# Patient Record
Sex: MALE | Race: WHITE | NOT HISPANIC OR LATINO | Employment: FULL TIME | URBAN - METROPOLITAN AREA
[De-identification: names, ages, dates, MRNs, and addresses within clinical notes are randomized per-mention and may not be internally consistent; named-entity substitution may affect disease eponyms.]

---

## 2017-04-17 ENCOUNTER — HOSPITAL ENCOUNTER (INPATIENT)
Facility: HOSPITAL | Age: 57
LOS: 1 days | DRG: 309 | End: 2017-04-18
Attending: EMERGENCY MEDICINE | Admitting: INTERNAL MEDICINE
Payer: COMMERCIAL

## 2017-04-17 ENCOUNTER — ALLSCRIPTS OFFICE VISIT (OUTPATIENT)
Dept: OTHER | Facility: OTHER | Age: 57
End: 2017-04-17

## 2017-04-17 ENCOUNTER — APPOINTMENT (EMERGENCY)
Dept: RADIOLOGY | Facility: HOSPITAL | Age: 57
DRG: 309 | End: 2017-04-17
Payer: COMMERCIAL

## 2017-04-17 DIAGNOSIS — R42 LIGHTHEADEDNESS: ICD-10-CM

## 2017-04-17 DIAGNOSIS — R94.31 EKG ABNORMALITIES: ICD-10-CM

## 2017-04-17 DIAGNOSIS — I47.2 V-TACH (HCC): Primary | ICD-10-CM

## 2017-04-17 LAB
ALBUMIN SERPL BCP-MCNC: 3.8 G/DL (ref 3.5–5)
ALP SERPL-CCNC: 95 U/L (ref 46–116)
ALT SERPL W P-5'-P-CCNC: 52 U/L (ref 12–78)
ANION GAP SERPL CALCULATED.3IONS-SCNC: 11 MMOL/L (ref 4–13)
APTT PPP: 26 SECONDS (ref 24–36)
AST SERPL W P-5'-P-CCNC: 30 U/L (ref 5–45)
ATRIAL RATE: 132 BPM
BASOPHILS # BLD AUTO: 0.03 THOUSANDS/ΜL (ref 0–0.1)
BASOPHILS NFR BLD AUTO: 0 % (ref 0–1)
BILIRUB SERPL-MCNC: 0.73 MG/DL (ref 0.2–1)
BUN SERPL-MCNC: 13 MG/DL (ref 5–25)
CALCIUM SERPL-MCNC: 9 MG/DL (ref 8.3–10.1)
CHLORIDE SERPL-SCNC: 105 MMOL/L (ref 100–108)
CO2 SERPL-SCNC: 25 MMOL/L (ref 21–32)
CREAT SERPL-MCNC: 1.06 MG/DL (ref 0.6–1.3)
EOSINOPHIL # BLD AUTO: 0.08 THOUSAND/ΜL (ref 0–0.61)
EOSINOPHIL NFR BLD AUTO: 1 % (ref 0–6)
ERYTHROCYTE [DISTWIDTH] IN BLOOD BY AUTOMATED COUNT: 13.1 % (ref 11.6–15.1)
GFR SERPL CREATININE-BSD FRML MDRD: >60 ML/MIN/1.73SQ M
GLUCOSE SERPL-MCNC: 100 MG/DL (ref 65–140)
HCT VFR BLD AUTO: 50 % (ref 36.5–49.3)
HGB BLD-MCNC: 17 G/DL (ref 12–17)
INR PPP: 1 (ref 0.86–1.16)
LYMPHOCYTES # BLD AUTO: 2.8 THOUSANDS/ΜL (ref 0.6–4.47)
LYMPHOCYTES NFR BLD AUTO: 21 % (ref 14–44)
MAGNESIUM SERPL-MCNC: 1.9 MG/DL (ref 1.6–2.6)
MCH RBC QN AUTO: 31.7 PG (ref 26.8–34.3)
MCHC RBC AUTO-ENTMCNC: 34 G/DL (ref 31.4–37.4)
MCV RBC AUTO: 93 FL (ref 82–98)
MONOCYTES # BLD AUTO: 1 THOUSAND/ΜL (ref 0.17–1.22)
MONOCYTES NFR BLD AUTO: 8 % (ref 4–12)
NEUTROPHILS # BLD AUTO: 9.41 THOUSANDS/ΜL (ref 1.85–7.62)
NEUTS SEG NFR BLD AUTO: 70 % (ref 43–75)
NRBC BLD AUTO-RTO: 0 /100 WBCS
P AXIS: -80 DEGREES
PLATELET # BLD AUTO: 289 THOUSANDS/UL (ref 149–390)
PMV BLD AUTO: 9.8 FL (ref 8.9–12.7)
POTASSIUM SERPL-SCNC: 4 MMOL/L (ref 3.5–5.3)
PROT SERPL-MCNC: 7.8 G/DL (ref 6.4–8.2)
PROTHROMBIN TIME: 13.2 SECONDS (ref 12–14.3)
QRS AXIS: 91 DEGREES
QRSD INTERVAL: 132 MS
QT INTERVAL: 350 MS
QTC INTERVAL: 518 MS
RBC # BLD AUTO: 5.36 MILLION/UL (ref 3.88–5.62)
SODIUM SERPL-SCNC: 141 MMOL/L (ref 136–145)
SPECIMEN SOURCE: NORMAL
T WAVE AXIS: -79 DEGREES
TROPONIN I BLD-MCNC: 0 NG/ML (ref 0–0.08)
TROPONIN I SERPL-MCNC: <0.02 NG/ML
TROPONIN I SERPL-MCNC: <0.02 NG/ML
TSH SERPL DL<=0.05 MIU/L-ACNC: 2.8 UIU/ML (ref 0.36–3.74)
VENTRICULAR RATE: 132 BPM
WBC # BLD AUTO: 13.32 THOUSAND/UL (ref 4.31–10.16)

## 2017-04-17 PROCEDURE — 85730 THROMBOPLASTIN TIME PARTIAL: CPT | Performed by: EMERGENCY MEDICINE

## 2017-04-17 PROCEDURE — 80053 COMPREHEN METABOLIC PANEL: CPT | Performed by: EMERGENCY MEDICINE

## 2017-04-17 PROCEDURE — 83735 ASSAY OF MAGNESIUM: CPT | Performed by: EMERGENCY MEDICINE

## 2017-04-17 PROCEDURE — 71010 HB CHEST X-RAY 1 VIEW FRONTAL (PORTABLE): CPT

## 2017-04-17 PROCEDURE — 84484 ASSAY OF TROPONIN QUANT: CPT

## 2017-04-17 PROCEDURE — 84443 ASSAY THYROID STIM HORMONE: CPT | Performed by: EMERGENCY MEDICINE

## 2017-04-17 PROCEDURE — 93005 ELECTROCARDIOGRAM TRACING: CPT

## 2017-04-17 PROCEDURE — 99285 EMERGENCY DEPT VISIT HI MDM: CPT

## 2017-04-17 PROCEDURE — 84484 ASSAY OF TROPONIN QUANT: CPT | Performed by: PHYSICIAN ASSISTANT

## 2017-04-17 PROCEDURE — 96361 HYDRATE IV INFUSION ADD-ON: CPT

## 2017-04-17 PROCEDURE — 85025 COMPLETE CBC W/AUTO DIFF WBC: CPT | Performed by: EMERGENCY MEDICINE

## 2017-04-17 PROCEDURE — 36415 COLL VENOUS BLD VENIPUNCTURE: CPT | Performed by: EMERGENCY MEDICINE

## 2017-04-17 PROCEDURE — 93005 ELECTROCARDIOGRAM TRACING: CPT | Performed by: EMERGENCY MEDICINE

## 2017-04-17 PROCEDURE — 96365 THER/PROPH/DIAG IV INF INIT: CPT

## 2017-04-17 PROCEDURE — 85610 PROTHROMBIN TIME: CPT | Performed by: EMERGENCY MEDICINE

## 2017-04-17 RX ORDER — ACETAMINOPHEN 325 MG/1
650 TABLET ORAL EVERY 4 HOURS PRN
Status: DISCONTINUED | OUTPATIENT
Start: 2017-04-17 | End: 2017-04-18 | Stop reason: HOSPADM

## 2017-04-17 RX ORDER — MULTIVITAMIN
1 TABLET ORAL DAILY
COMMUNITY

## 2017-04-17 RX ORDER — SODIUM CHLORIDE 9 MG/ML
50 INJECTION, SOLUTION INTRAVENOUS CONTINUOUS
Status: DISCONTINUED | OUTPATIENT
Start: 2017-04-17 | End: 2017-04-18 | Stop reason: HOSPADM

## 2017-04-17 RX ADMIN — AMIODARONE HYDROCHLORIDE 150 MG: 50 INJECTION, SOLUTION INTRAVENOUS at 13:47

## 2017-04-17 RX ADMIN — SODIUM CHLORIDE 50 ML/HR: 0.9 INJECTION, SOLUTION INTRAVENOUS at 17:04

## 2017-04-17 RX ADMIN — METOPROLOL TARTRATE 5 MG: 5 INJECTION INTRAVENOUS at 14:33

## 2017-04-17 RX ADMIN — SODIUM CHLORIDE 1000 ML: 0.9 INJECTION, SOLUTION INTRAVENOUS at 13:22

## 2017-04-17 RX ADMIN — METOPROLOL TARTRATE 5 MG: 5 INJECTION INTRAVENOUS at 15:07

## 2017-04-17 RX ADMIN — METOPROLOL TARTRATE 5 MG: 5 INJECTION INTRAVENOUS at 14:49

## 2017-04-17 RX ADMIN — METOPROLOL TARTRATE 25 MG: 25 TABLET ORAL at 17:08

## 2017-04-17 RX ADMIN — METOPROLOL TARTRATE 25 MG: 25 TABLET ORAL at 22:57

## 2017-04-18 ENCOUNTER — APPOINTMENT (INPATIENT)
Dept: NON INVASIVE DIAGNOSTICS | Facility: HOSPITAL | Age: 57
DRG: 309 | End: 2017-04-18
Payer: COMMERCIAL

## 2017-04-18 ENCOUNTER — APPOINTMENT (INPATIENT)
Dept: NON INVASIVE DIAGNOSTICS | Facility: HOSPITAL | Age: 57
DRG: 287 | End: 2017-04-18
Payer: COMMERCIAL

## 2017-04-18 ENCOUNTER — GENERIC CONVERSION - ENCOUNTER (OUTPATIENT)
Dept: OTHER | Facility: OTHER | Age: 57
End: 2017-04-18

## 2017-04-18 ENCOUNTER — APPOINTMENT (INPATIENT)
Dept: NON INVASIVE DIAGNOSTICS | Facility: HOSPITAL | Age: 57
DRG: 287 | End: 2017-04-18
Attending: INTERNAL MEDICINE
Payer: COMMERCIAL

## 2017-04-18 ENCOUNTER — HOSPITAL ENCOUNTER (INPATIENT)
Facility: HOSPITAL | Age: 57
LOS: 3 days | Discharge: HOME | DRG: 287 | End: 2017-04-21
Attending: INTERNAL MEDICINE | Admitting: INTERNAL MEDICINE
Payer: COMMERCIAL

## 2017-04-18 VITALS
RESPIRATION RATE: 21 BRPM | OXYGEN SATURATION: 93 % | DIASTOLIC BLOOD PRESSURE: 46 MMHG | WEIGHT: 315 LBS | BODY MASS INDEX: 45.1 KG/M2 | HEART RATE: 82 BPM | SYSTOLIC BLOOD PRESSURE: 107 MMHG | TEMPERATURE: 98.7 F | HEIGHT: 70 IN

## 2017-04-18 DIAGNOSIS — I47.2 V-TACH (HCC): ICD-10-CM

## 2017-04-18 LAB
ANION GAP SERPL CALCULATED.3IONS-SCNC: 10 MMOL/L (ref 4–13)
ANION GAP SERPL CALCULATED.3IONS-SCNC: 7 MMOL/L (ref 4–13)
ATRIAL RATE: 83 BPM
ATRIAL RATE: 85 BPM
BUN SERPL-MCNC: 17 MG/DL (ref 5–25)
BUN SERPL-MCNC: 17 MG/DL (ref 5–25)
CALCIUM SERPL-MCNC: 8.1 MG/DL (ref 8.3–10.1)
CALCIUM SERPL-MCNC: 8.9 MG/DL (ref 8.3–10.1)
CHLORIDE SERPL-SCNC: 105 MMOL/L (ref 100–108)
CHLORIDE SERPL-SCNC: 105 MMOL/L (ref 100–108)
CHOLEST SERPL-MCNC: 170 MG/DL (ref 50–200)
CO2 SERPL-SCNC: 23 MMOL/L (ref 21–32)
CO2 SERPL-SCNC: 24 MMOL/L (ref 21–32)
CREAT SERPL-MCNC: 0.79 MG/DL (ref 0.6–1.3)
CREAT SERPL-MCNC: 0.84 MG/DL (ref 0.6–1.3)
ERYTHROCYTE [DISTWIDTH] IN BLOOD BY AUTOMATED COUNT: 13 % (ref 11.6–15.1)
GFR SERPL CREATININE-BSD FRML MDRD: >60 ML/MIN/1.73SQ M
GFR SERPL CREATININE-BSD FRML MDRD: >60 ML/MIN/1.73SQ M
GLUCOSE SERPL-MCNC: 105 MG/DL (ref 65–140)
GLUCOSE SERPL-MCNC: 83 MG/DL (ref 65–140)
HCT VFR BLD AUTO: 46.9 % (ref 36.5–49.3)
HDLC SERPL-MCNC: 33 MG/DL (ref 40–60)
HGB BLD-MCNC: 15.8 G/DL (ref 12–17)
INR PPP: 1.07 (ref 0.86–1.16)
LDLC SERPL CALC-MCNC: 117 MG/DL (ref 0–100)
MAGNESIUM SERPL-MCNC: 2.1 MG/DL (ref 1.6–2.6)
MCH RBC QN AUTO: 31.3 PG (ref 26.8–34.3)
MCHC RBC AUTO-ENTMCNC: 33.7 G/DL (ref 31.4–37.4)
MCV RBC AUTO: 93 FL (ref 82–98)
P AXIS: 48 DEGREES
P AXIS: 57 DEGREES
PLATELET # BLD AUTO: 294 THOUSANDS/UL (ref 149–390)
PMV BLD AUTO: 9.9 FL (ref 8.9–12.7)
POTASSIUM SERPL-SCNC: 3.8 MMOL/L (ref 3.5–5.3)
POTASSIUM SERPL-SCNC: 5.6 MMOL/L (ref 3.5–5.3)
PR INTERVAL: 200 MS
PR INTERVAL: 213 MS
PROTHROMBIN TIME: 14 SECONDS (ref 12–14.3)
QRS AXIS: 15 DEGREES
QRS AXIS: 25 DEGREES
QRSD INTERVAL: 104 MS
QRSD INTERVAL: 96 MS
QT INTERVAL: 388 MS
QT INTERVAL: 404 MS
QTC INTERVAL: 456 MS
QTC INTERVAL: 480 MS
RBC # BLD AUTO: 5.05 MILLION/UL (ref 3.88–5.62)
SODIUM SERPL-SCNC: 136 MMOL/L (ref 136–145)
SODIUM SERPL-SCNC: 138 MMOL/L (ref 136–145)
T WAVE AXIS: 64 DEGREES
T WAVE AXIS: 65 DEGREES
TRIGL SERPL-MCNC: 98 MG/DL
VENTRICULAR RATE: 83 BPM
VENTRICULAR RATE: 85 BPM
WBC # BLD AUTO: 11.53 THOUSAND/UL (ref 4.31–10.16)

## 2017-04-18 PROCEDURE — A9270 NON-COVERED ITEM OR SERVICE: HCPCS | Performed by: INTERNAL MEDICINE

## 2017-04-18 PROCEDURE — 80048 BASIC METABOLIC PNL TOTAL CA: CPT | Performed by: PHYSICIAN ASSISTANT

## 2017-04-18 PROCEDURE — 83735 ASSAY OF MAGNESIUM: CPT | Performed by: PHYSICIAN ASSISTANT

## 2017-04-18 PROCEDURE — C1769 GUIDE WIRE: HCPCS | Performed by: INTERNAL MEDICINE

## 2017-04-18 PROCEDURE — 93306 TTE W/DOPPLER COMPLETE: CPT

## 2017-04-18 PROCEDURE — 93458 L HRT ARTERY/VENTRICLE ANGIO: CPT | Performed by: INTERNAL MEDICINE

## 2017-04-18 PROCEDURE — B2151ZZ FLUOROSCOPY OF LEFT HEART USING LOW OSMOLAR CONTRAST: ICD-10-PCS | Performed by: INTERNAL MEDICINE

## 2017-04-18 PROCEDURE — 80048 BASIC METABOLIC PNL TOTAL CA: CPT | Performed by: INTERNAL MEDICINE

## 2017-04-18 PROCEDURE — 99153 MOD SED SAME PHYS/QHP EA: CPT | Performed by: INTERNAL MEDICINE

## 2017-04-18 PROCEDURE — 85610 PROTHROMBIN TIME: CPT | Performed by: INTERNAL MEDICINE

## 2017-04-18 PROCEDURE — B2111ZZ FLUOROSCOPY OF MULTIPLE CORONARY ARTERIES USING LOW OSMOLAR CONTRAST: ICD-10-PCS | Performed by: INTERNAL MEDICINE

## 2017-04-18 PROCEDURE — 99152 MOD SED SAME PHYS/QHP 5/>YRS: CPT | Performed by: INTERNAL MEDICINE

## 2017-04-18 PROCEDURE — 85027 COMPLETE CBC AUTOMATED: CPT | Performed by: INTERNAL MEDICINE

## 2017-04-18 PROCEDURE — C1894 INTRO/SHEATH, NON-LASER: HCPCS | Performed by: INTERNAL MEDICINE

## 2017-04-18 PROCEDURE — 4A023N7 MEASUREMENT OF CARDIAC SAMPLING AND PRESSURE, LEFT HEART, PERCUTANEOUS APPROACH: ICD-10-PCS | Performed by: INTERNAL MEDICINE

## 2017-04-18 PROCEDURE — 80061 LIPID PANEL: CPT | Performed by: INTERNAL MEDICINE

## 2017-04-18 PROCEDURE — 93005 ELECTROCARDIOGRAM TRACING: CPT | Performed by: INTERNAL MEDICINE

## 2017-04-18 RX ORDER — ACETAMINOPHEN 325 MG/1
650 TABLET ORAL EVERY 4 HOURS PRN
Status: CANCELLED | OUTPATIENT
Start: 2017-04-18

## 2017-04-18 RX ORDER — SODIUM CHLORIDE 9 MG/ML
50 INJECTION, SOLUTION INTRAVENOUS CONTINUOUS
Status: DISCONTINUED | OUTPATIENT
Start: 2017-04-18 | End: 2017-04-18

## 2017-04-18 RX ORDER — SODIUM CHLORIDE 9 MG/ML
50 INJECTION, SOLUTION INTRAVENOUS CONTINUOUS
Status: CANCELLED | OUTPATIENT
Start: 2017-04-18 | End: 2017-04-29

## 2017-04-18 RX ORDER — MIDAZOLAM HYDROCHLORIDE 1 MG/ML
INJECTION INTRAMUSCULAR; INTRAVENOUS CODE/TRAUMA/SEDATION MEDICATION
Status: COMPLETED | OUTPATIENT
Start: 2017-04-18 | End: 2017-04-18

## 2017-04-18 RX ORDER — SODIUM CHLORIDE 9 MG/ML
100 INJECTION, SOLUTION INTRAVENOUS CONTINUOUS
Status: DISPENSED | OUTPATIENT
Start: 2017-04-18 | End: 2017-04-18

## 2017-04-18 RX ORDER — FENTANYL CITRATE 50 UG/ML
INJECTION, SOLUTION INTRAMUSCULAR; INTRAVENOUS CODE/TRAUMA/SEDATION MEDICATION
Status: COMPLETED | OUTPATIENT
Start: 2017-04-18 | End: 2017-04-18

## 2017-04-18 RX ORDER — ASPIRIN 81 MG/1
TABLET, CHEWABLE ORAL CODE/TRAUMA/SEDATION MEDICATION
Status: COMPLETED | OUTPATIENT
Start: 2017-04-18 | End: 2017-04-18

## 2017-04-18 RX ORDER — ASPIRIN 81 MG/1
324 TABLET, CHEWABLE ORAL ONCE
Status: DISCONTINUED | OUTPATIENT
Start: 2017-04-18 | End: 2017-04-21 | Stop reason: HOSPADM

## 2017-04-18 RX ORDER — LIDOCAINE HYDROCHLORIDE 10 MG/ML
INJECTION, SOLUTION INFILTRATION; PERINEURAL CODE/TRAUMA/SEDATION MEDICATION
Status: COMPLETED | OUTPATIENT
Start: 2017-04-18 | End: 2017-04-18

## 2017-04-18 RX ORDER — SODIUM CHLORIDE 9 MG/ML
75 INJECTION, SOLUTION INTRAVENOUS CONTINUOUS
Status: DISCONTINUED | OUTPATIENT
Start: 2017-04-18 | End: 2017-04-21 | Stop reason: HOSPADM

## 2017-04-18 RX ORDER — HEPARIN SODIUM 1000 [USP'U]/ML
INJECTION, SOLUTION INTRAVENOUS; SUBCUTANEOUS CODE/TRAUMA/SEDATION MEDICATION
Status: COMPLETED | OUTPATIENT
Start: 2017-04-18 | End: 2017-04-18

## 2017-04-18 RX ORDER — ACETAMINOPHEN 325 MG/1
650 TABLET ORAL EVERY 4 HOURS PRN
Status: DISCONTINUED | OUTPATIENT
Start: 2017-04-18 | End: 2017-04-21 | Stop reason: HOSPADM

## 2017-04-18 RX ORDER — ASPIRIN 325 MG
325 TABLET, DELAYED RELEASE (ENTERIC COATED) ORAL ONCE
Status: COMPLETED | OUTPATIENT
Start: 2017-04-18 | End: 2017-04-18

## 2017-04-18 RX ORDER — VERAPAMIL HYDROCHLORIDE 2.5 MG/ML
INJECTION, SOLUTION INTRAVENOUS CODE/TRAUMA/SEDATION MEDICATION
Status: COMPLETED | OUTPATIENT
Start: 2017-04-18 | End: 2017-04-18

## 2017-04-18 RX ORDER — NITROGLYCERIN 20 MG/100ML
INJECTION INTRAVENOUS CODE/TRAUMA/SEDATION MEDICATION
Status: COMPLETED | OUTPATIENT
Start: 2017-04-18 | End: 2017-04-18

## 2017-04-18 RX ADMIN — ASPIRIN 324 MG: 81 TABLET, CHEWABLE ORAL at 13:20

## 2017-04-18 RX ADMIN — HEPARIN SODIUM 4000 UNITS: 1000 INJECTION INTRAVENOUS; SUBCUTANEOUS at 13:24

## 2017-04-18 RX ADMIN — ASPIRIN 325 MG: 325 TABLET, DELAYED RELEASE ORAL at 12:30

## 2017-04-18 RX ADMIN — LIDOCAINE HYDROCHLORIDE 1 ML: 10 INJECTION, SOLUTION INFILTRATION; PERINEURAL at 13:22

## 2017-04-18 RX ADMIN — NITROGLYCERIN 200 MCG: 20 INJECTION INTRAVENOUS at 13:24

## 2017-04-18 RX ADMIN — SODIUM CHLORIDE 75 ML/HR: 0.9 INJECTION, SOLUTION INTRAVENOUS at 22:35

## 2017-04-18 RX ADMIN — METOPROLOL TARTRATE 25 MG: 25 TABLET ORAL at 05:27

## 2017-04-18 RX ADMIN — METOPROLOL TARTRATE 25 MG: 25 TABLET ORAL at 17:59

## 2017-04-18 RX ADMIN — MIDAZOLAM HYDROCHLORIDE 2 MG: 1 INJECTION, SOLUTION INTRAMUSCULAR; INTRAVENOUS at 13:20

## 2017-04-18 RX ADMIN — VERAPAMIL HYDROCHLORIDE 2.5 MG: 2.5 INJECTION, SOLUTION INTRAVENOUS at 13:24

## 2017-04-18 RX ADMIN — FENTANYL CITRATE 50 MCG: 50 INJECTION, SOLUTION INTRAMUSCULAR; INTRAVENOUS at 13:20

## 2017-04-18 RX ADMIN — FENTANYL CITRATE 25 MCG: 50 INJECTION, SOLUTION INTRAMUSCULAR; INTRAVENOUS at 13:41

## 2017-04-18 RX ADMIN — METOPROLOL TARTRATE 25 MG: 25 TABLET ORAL at 22:33

## 2017-04-18 RX ADMIN — ENOXAPARIN SODIUM 40 MG: 40 INJECTION SUBCUTANEOUS at 09:42

## 2017-04-18 RX ADMIN — SODIUM CHLORIDE 100 ML/HR: 0.9 INJECTION, SOLUTION INTRAVENOUS at 14:28

## 2017-04-18 RX ADMIN — MIDAZOLAM HYDROCHLORIDE 1 MG: 1 INJECTION, SOLUTION INTRAMUSCULAR; INTRAVENOUS at 13:41

## 2017-04-18 RX ADMIN — IOHEXOL 80 ML: 350 INJECTION, SOLUTION INTRAVENOUS at 13:43

## 2017-04-18 RX ADMIN — SODIUM CHLORIDE 50 ML/HR: 0.9 INJECTION, SOLUTION INTRAVENOUS at 12:30

## 2017-04-19 LAB
ANION GAP SERPL CALCULATED.3IONS-SCNC: 10 MMOL/L (ref 4–13)
BUN SERPL-MCNC: 15 MG/DL (ref 5–25)
CALCIUM SERPL-MCNC: 8.4 MG/DL (ref 8.3–10.1)
CHLORIDE SERPL-SCNC: 106 MMOL/L (ref 100–108)
CO2 SERPL-SCNC: 23 MMOL/L (ref 21–32)
CREAT SERPL-MCNC: 0.94 MG/DL (ref 0.6–1.3)
GFR SERPL CREATININE-BSD FRML MDRD: >60 ML/MIN/1.73SQ M
GLUCOSE SERPL-MCNC: 109 MG/DL (ref 65–140)
MAGNESIUM SERPL-MCNC: 2.2 MG/DL (ref 1.6–2.6)
POTASSIUM SERPL-SCNC: 3.9 MMOL/L (ref 3.5–5.3)
SODIUM SERPL-SCNC: 139 MMOL/L (ref 136–145)

## 2017-04-19 PROCEDURE — A9270 NON-COVERED ITEM OR SERVICE: HCPCS | Performed by: PHYSICIAN ASSISTANT

## 2017-04-19 PROCEDURE — 80048 BASIC METABOLIC PNL TOTAL CA: CPT | Performed by: INTERNAL MEDICINE

## 2017-04-19 PROCEDURE — A9270 NON-COVERED ITEM OR SERVICE: HCPCS | Performed by: INTERNAL MEDICINE

## 2017-04-19 PROCEDURE — 83735 ASSAY OF MAGNESIUM: CPT | Performed by: INTERNAL MEDICINE

## 2017-04-19 RX ORDER — METOPROLOL SUCCINATE 50 MG/1
50 TABLET, EXTENDED RELEASE ORAL EVERY 12 HOURS SCHEDULED
Status: DISCONTINUED | OUTPATIENT
Start: 2017-04-19 | End: 2017-04-21 | Stop reason: HOSPADM

## 2017-04-19 RX ADMIN — METOPROLOL TARTRATE 25 MG: 25 TABLET ORAL at 11:02

## 2017-04-19 RX ADMIN — METOPROLOL SUCCINATE 50 MG: 50 TABLET, EXTENDED RELEASE ORAL at 21:47

## 2017-04-19 RX ADMIN — METOPROLOL TARTRATE 25 MG: 25 TABLET ORAL at 04:47

## 2017-04-19 RX ADMIN — METOPROLOL TARTRATE 25 MG: 25 TABLET ORAL at 16:30

## 2017-04-20 ENCOUNTER — GENERIC CONVERSION - ENCOUNTER (OUTPATIENT)
Dept: OTHER | Facility: OTHER | Age: 57
End: 2017-04-20

## 2017-04-20 ENCOUNTER — APPOINTMENT (INPATIENT)
Dept: RADIOLOGY | Facility: HOSPITAL | Age: 57
DRG: 287 | End: 2017-04-20
Payer: COMMERCIAL

## 2017-04-20 LAB
ATRIAL RATE: 91 BPM
P AXIS: 42 DEGREES
PR INTERVAL: 208 MS
QRS AXIS: 2 DEGREES
QRSD INTERVAL: 102 MS
QT INTERVAL: 386 MS
QTC INTERVAL: 474 MS
T WAVE AXIS: 54 DEGREES
VENTRICULAR RATE: 91 BPM

## 2017-04-20 PROCEDURE — A9270 NON-COVERED ITEM OR SERVICE: HCPCS | Performed by: PHYSICIAN ASSISTANT

## 2017-04-20 PROCEDURE — 93005 ELECTROCARDIOGRAM TRACING: CPT | Performed by: PHYSICIAN ASSISTANT

## 2017-04-20 PROCEDURE — 75561 CARDIAC MRI FOR MORPH W/DYE: CPT

## 2017-04-20 PROCEDURE — A9585 GADOBUTROL INJECTION: HCPCS | Performed by: INTERNAL MEDICINE

## 2017-04-20 RX ORDER — FLECAINIDE ACETATE 100 MG/1
100 TABLET ORAL ONCE
Status: COMPLETED | OUTPATIENT
Start: 2017-04-20 | End: 2017-04-20

## 2017-04-20 RX ORDER — FLECAINIDE ACETATE 100 MG/1
100 TABLET ORAL EVERY 12 HOURS SCHEDULED
Status: DISCONTINUED | OUTPATIENT
Start: 2017-04-20 | End: 2017-04-21 | Stop reason: HOSPADM

## 2017-04-20 RX ADMIN — METOPROLOL SUCCINATE 50 MG: 50 TABLET, EXTENDED RELEASE ORAL at 21:48

## 2017-04-20 RX ADMIN — FLECAINIDE ACETATE 100 MG: 100 TABLET ORAL at 14:26

## 2017-04-20 RX ADMIN — FLECAINIDE ACETATE 100 MG: 100 TABLET ORAL at 22:21

## 2017-04-20 RX ADMIN — GADOBUTROL 30 ML: 604.72 INJECTION INTRAVENOUS at 11:11

## 2017-04-20 RX ADMIN — METOPROLOL SUCCINATE 50 MG: 50 TABLET, EXTENDED RELEASE ORAL at 08:42

## 2017-04-21 VITALS
BODY MASS INDEX: 45.1 KG/M2 | DIASTOLIC BLOOD PRESSURE: 80 MMHG | HEART RATE: 74 BPM | RESPIRATION RATE: 20 BRPM | WEIGHT: 315 LBS | OXYGEN SATURATION: 96 % | SYSTOLIC BLOOD PRESSURE: 122 MMHG | TEMPERATURE: 98.6 F | HEIGHT: 70 IN

## 2017-04-21 PROCEDURE — A9270 NON-COVERED ITEM OR SERVICE: HCPCS | Performed by: PHYSICIAN ASSISTANT

## 2017-04-21 RX ORDER — METOPROLOL SUCCINATE 50 MG/1
50 TABLET, EXTENDED RELEASE ORAL EVERY 12 HOURS SCHEDULED
Qty: 60 TABLET | Refills: 3 | Status: SHIPPED | OUTPATIENT
Start: 2017-04-21 | End: 2018-06-02 | Stop reason: SDUPTHER

## 2017-04-21 RX ORDER — FLECAINIDE ACETATE 100 MG/1
100 TABLET ORAL EVERY 12 HOURS SCHEDULED
Qty: 60 TABLET | Refills: 2 | Status: SHIPPED | OUTPATIENT
Start: 2017-04-21 | End: 2018-06-02 | Stop reason: SDUPTHER

## 2017-04-21 RX ADMIN — METOPROLOL SUCCINATE 50 MG: 50 TABLET, EXTENDED RELEASE ORAL at 09:10

## 2017-04-21 RX ADMIN — FLECAINIDE ACETATE 100 MG: 100 TABLET ORAL at 09:10

## 2017-04-28 ENCOUNTER — ALLSCRIPTS OFFICE VISIT (OUTPATIENT)
Dept: OTHER | Facility: OTHER | Age: 57
End: 2017-04-28

## 2017-06-08 ENCOUNTER — HOSPITAL ENCOUNTER (OUTPATIENT)
Dept: NON INVASIVE DIAGNOSTICS | Facility: HOSPITAL | Age: 57
Discharge: HOME | End: 2017-06-08
Payer: COMMERCIAL

## 2017-06-08 DIAGNOSIS — R00.0 TACHYCARDIA: ICD-10-CM

## 2017-06-08 PROCEDURE — 93225 XTRNL ECG REC<48 HRS REC: CPT

## 2017-06-08 PROCEDURE — 93226 XTRNL ECG REC<48 HR SCAN A/R: CPT

## 2017-06-09 ENCOUNTER — ALLSCRIPTS OFFICE VISIT (OUTPATIENT)
Dept: OTHER | Facility: OTHER | Age: 57
End: 2017-06-09

## 2017-06-29 ENCOUNTER — APPOINTMENT (OUTPATIENT)
Dept: OCCUPATIONAL MEDICINE | Facility: CLINIC | Age: 57
End: 2017-06-29
Payer: OTHER MISCELLANEOUS

## 2017-06-29 ENCOUNTER — TRANSCRIBE ORDERS (OUTPATIENT)
Dept: OCCUPATIONAL MEDICINE | Facility: CLINIC | Age: 57
End: 2017-06-29

## 2017-06-29 ENCOUNTER — APPOINTMENT (OUTPATIENT)
Dept: RADIOLOGY | Facility: CLINIC | Age: 57
End: 2017-06-29
Payer: OTHER MISCELLANEOUS

## 2017-06-29 DIAGNOSIS — S99.912A ANKLE INJURY, LEFT, INITIAL ENCOUNTER: ICD-10-CM

## 2017-06-29 DIAGNOSIS — S89.91XA LEG INJURY, RIGHT, INITIAL ENCOUNTER: Primary | ICD-10-CM

## 2017-06-29 DIAGNOSIS — S89.91XA LEG INJURY, RIGHT, INITIAL ENCOUNTER: ICD-10-CM

## 2017-06-29 PROCEDURE — 73610 X-RAY EXAM OF ANKLE: CPT

## 2017-06-29 PROCEDURE — 73590 X-RAY EXAM OF LOWER LEG: CPT

## 2017-06-29 PROCEDURE — 99203 OFFICE O/P NEW LOW 30 MIN: CPT

## 2017-07-03 ENCOUNTER — APPOINTMENT (OUTPATIENT)
Dept: OCCUPATIONAL MEDICINE | Facility: CLINIC | Age: 57
End: 2017-07-03
Payer: OTHER MISCELLANEOUS

## 2017-07-03 PROCEDURE — 99213 OFFICE O/P EST LOW 20 MIN: CPT

## 2017-10-10 ENCOUNTER — ALLSCRIPTS OFFICE VISIT (OUTPATIENT)
Dept: OTHER | Facility: OTHER | Age: 57
End: 2017-10-10

## 2017-10-11 ENCOUNTER — LAB REQUISITION (OUTPATIENT)
Dept: LAB | Facility: HOSPITAL | Age: 57
End: 2017-10-11
Payer: COMMERCIAL

## 2017-10-11 DIAGNOSIS — Z13.1 ENCOUNTER FOR SCREENING FOR DIABETES MELLITUS: ICD-10-CM

## 2017-10-11 DIAGNOSIS — Z00.00 ENCOUNTER FOR GENERAL ADULT MEDICAL EXAMINATION WITHOUT ABNORMAL FINDINGS: ICD-10-CM

## 2017-10-11 DIAGNOSIS — Z12.11 ENCOUNTER FOR SCREENING FOR MALIGNANT NEOPLASM OF COLON: ICD-10-CM

## 2017-10-11 DIAGNOSIS — E66.01 MORBID (SEVERE) OBESITY DUE TO EXCESS CALORIES (HCC): ICD-10-CM

## 2017-10-11 DIAGNOSIS — Z13.29 ENCOUNTER FOR SCREENING FOR OTHER SUSPECTED ENDOCRINE DISORDER: ICD-10-CM

## 2017-10-11 DIAGNOSIS — Z12.5 ENCOUNTER FOR SCREENING FOR MALIGNANT NEOPLASM OF PROSTATE: ICD-10-CM

## 2017-10-11 LAB
ALBUMIN SERPL BCP-MCNC: 4.4 G/DL (ref 3.5–5)
ALP SERPL-CCNC: 87 U/L (ref 46–116)
ALT SERPL W P-5'-P-CCNC: 49 U/L (ref 12–78)
ANION GAP SERPL CALCULATED.3IONS-SCNC: 10 MMOL/L (ref 4–13)
AST SERPL W P-5'-P-CCNC: 39 U/L (ref 5–45)
BILIRUB SERPL-MCNC: 1.14 MG/DL (ref 0.2–1)
BUN SERPL-MCNC: 14 MG/DL (ref 5–25)
CALCIUM SERPL-MCNC: 9.1 MG/DL (ref 8.3–10.1)
CHLORIDE SERPL-SCNC: 104 MMOL/L (ref 100–108)
CHOLEST SERPL-MCNC: 192 MG/DL (ref 50–200)
CO2 SERPL-SCNC: 25 MMOL/L (ref 21–32)
CREAT SERPL-MCNC: 0.95 MG/DL (ref 0.6–1.3)
ERYTHROCYTE [DISTWIDTH] IN BLOOD BY AUTOMATED COUNT: 12.9 % (ref 11.6–15.1)
EST. AVERAGE GLUCOSE BLD GHB EST-MCNC: 126 MG/DL
GFR SERPL CREATININE-BSD FRML MDRD: 90 ML/MIN/1.73SQ M
GLUCOSE P FAST SERPL-MCNC: 79 MG/DL (ref 65–99)
HBA1C MFR BLD: 6 % (ref 4.2–6.3)
HCT VFR BLD AUTO: 47 % (ref 36.5–49.3)
HDLC SERPL-MCNC: 33 MG/DL (ref 40–60)
HGB BLD-MCNC: 15.9 G/DL (ref 12–17)
LDLC SERPL CALC-MCNC: 131 MG/DL (ref 0–100)
MCH RBC QN AUTO: 32 PG (ref 26.8–34.3)
MCHC RBC AUTO-ENTMCNC: 33.8 G/DL (ref 31.4–37.4)
MCV RBC AUTO: 95 FL (ref 82–98)
PLATELET # BLD AUTO: 320 THOUSANDS/UL (ref 149–390)
PMV BLD AUTO: 10 FL (ref 8.9–12.7)
POTASSIUM SERPL-SCNC: 4 MMOL/L (ref 3.5–5.3)
PROT SERPL-MCNC: 8.1 G/DL (ref 6.4–8.2)
PSA SERPL-MCNC: 0.2 NG/ML (ref 0–4)
RBC # BLD AUTO: 4.97 MILLION/UL (ref 3.88–5.62)
SODIUM SERPL-SCNC: 139 MMOL/L (ref 136–145)
TRIGL SERPL-MCNC: 138 MG/DL
TSH SERPL DL<=0.05 MIU/L-ACNC: 2.69 UIU/ML (ref 0.36–3.74)
WBC # BLD AUTO: 11.5 THOUSAND/UL (ref 4.31–10.16)

## 2017-10-11 PROCEDURE — 83036 HEMOGLOBIN GLYCOSYLATED A1C: CPT | Performed by: FAMILY MEDICINE

## 2017-10-11 PROCEDURE — 80053 COMPREHEN METABOLIC PANEL: CPT | Performed by: FAMILY MEDICINE

## 2017-10-11 PROCEDURE — 84443 ASSAY THYROID STIM HORMONE: CPT | Performed by: FAMILY MEDICINE

## 2017-10-11 PROCEDURE — 80061 LIPID PANEL: CPT | Performed by: FAMILY MEDICINE

## 2017-10-11 PROCEDURE — G0103 PSA SCREENING: HCPCS | Performed by: FAMILY MEDICINE

## 2017-10-11 PROCEDURE — 85027 COMPLETE CBC AUTOMATED: CPT | Performed by: FAMILY MEDICINE

## 2017-10-12 ENCOUNTER — GENERIC CONVERSION - ENCOUNTER (OUTPATIENT)
Dept: OTHER | Facility: OTHER | Age: 57
End: 2017-10-12

## 2017-12-07 ENCOUNTER — ALLSCRIPTS OFFICE VISIT (OUTPATIENT)
Dept: OTHER | Facility: OTHER | Age: 57
End: 2017-12-07

## 2017-12-19 NOTE — PROGRESS NOTES
Assessment  Assessed    1  Ventricular tachycardia (427 1) (I47 2)   2  Morbid obesity (278 01) (E66 01)    Plan  Health Maintenance    · EKG/ECG- POC; Status:Complete;   Done: 66YCY6150 10:58AM   Perform: In Office; Last Updated By:Jameson Nava; 12/7/2017 10:59:09 AM;Ordered;For:Health Maintenance; Ordered By:Chapo Correa; Discussion/Summary  Cardiology Discussion Summary Free Text Note Form [facility]:   1  Ventricular tachycardiano further episodesWas admitted with episode of lightheadedness, ventricular tachycardia noted on EKGOrigin of tachycardia - right ventricular outflow tract, septal, anteriorCardiogram shows retained ventricular functionCardiac MRI did not show any evidence of dysplasia or scarCardiac catheterization did not reveal any significant epicardial heart diseasePatient is started on a combination of flecainide and metoprololHe has tolerated the same wellA repeat Holter does not reveal any ventricular tachycardia or PVCGoal is to continue the current treatment regimenThe QRS has remained stable without any prolongation2  Morbid obesityThe patient does have significant obesityDiscussed in detail the role of diet and exerciseDietetic counselor for about 80% of increased weightstressed importance of stopping pizza as well as pasta  Chief Complaint  follow up of VT Chief Complaint Free Text Note Form: 6 month follow up with EKG      History of Present Illness  Cardiology HPI Free Text Note Form [facility]: no further episodes of VTWhen he had his episode of ventricular tachycardia, his symptoms were lightheadedness and palpitation  Currently he does not have any further episodes of palpitationHe does not describe angina like chest pain or chest pressureHe is not complaining of worsening orthopnea, paroxysmal nocturnal dyspnea or leg swellingHe is not complaining of any episodes of syncopehe has not lost weightThe patient is well known to me from hospital admissionHe came in with right ventricular outflow tract tachycardiaHe underwent a cardiac catheterization echocardiogram and cardiac MRI which were all normalThere is no evidence of scar in the heartThe patient was started on a combination of metoprolol and flecainideThe patient's work involves walking up to 2 miles in a dayHe has no limitation in walking on level groundHe does get short of breath on walking uphill or going up more than 2 flights of stairsHe does not have any history of diabetes, hyperlipidemiaHe does have a strong family history of diabetesHe snores at night, has daytime sleepiness, and morning fatigabilityHe has still not controlled his diet and His BMI is close to 50      Review of Systems  As described in my history of present illness All other systems reviewed and are negative Cardiology Male ROS:    Cardiac: No complaints of chest pain, no palpitations, no fainiting  Skin: No complaints of nonhealing sores or skin rash  Genitourinary: No complaints of recurrent urinary tract infections, frequent urination at night, difficult urination, blood in urine, kidney stones, loss of bladder control, no kidney or prostate problems, no erectile dysfunction  Psychological: No complaints of feeling depressed, anxiety, panic attacks, or difficulty concentrating  General: lack of energy/fatigue  Respiratory: No complaints of shortness of breath, cough with sputum, or wheezing  HEENT: No complaints of serious problems, hearing problems, nose problems, throat problems, or snoring  Gastrointestinal: No complaints of liver problems, nausea, vomiting, heartburn, constipation, bloody stools, diarrhea, problems swallowing, adbominal pain, or rectal bleeding  Neurological: No complaints of numbness, tingling, dizziness, weakness, seizures, headaches, syncope or fainting, AM fatigue, daytime sleepiness, no witnessed apnea episodes  Musculoskeletal: back pain      Active Problems  Problems    1  Colon cancer screening (V76 51) (Z12 11)   2   Encounter for prostate cancer screening (V76 44) (Z12 5)   3  Morbid obesity (278 01) (E66 01)   4  Preventative health care (V70 0) (Z00 00)   5  Screening for cholesterol level (V77 91) (Z13 220)   6  Screening for diabetes mellitus (DM) (V77 1) (Z13 1)   7  Screening for iron deficiency anemia (V78 0) (Z13 0)   8  Screening for thyroid disorder (V77 0) (Z13 29)   9  Tinnitus (388 30) (H93 19)   10  Ventricular tachycardia (427 1) (I47 2)   11  Vision problem (V41 0) (H54 7)    Past Medical History  Problems    1  History of Acute upper respiratory infection (465 9) (J06 9)   2  History of Encounter for screening for diabetes mellitus (V77 1) (Z13 1)   3  Denied: History of substance abuse   4  History of tinnitus (V12 49) (Z86 69)   5  History of Intermittent lightheadedness (780 4) (R42)   6  History of Laceration of thumb, left (883 0) (S61 012A)   7  Denied: History of Mental health problem   8  History of Pressure in head (784 0) (R51)   9  History of Screening for cardiovascular condition (V81 2) (Z13 6)   10  History of Screening for deficiency anemia (V78 1) (Z13 0)   11  History of Screening for thyroid disorder (V77 0) (Z13 29)  Active Problems And Past Medical History Reviewed: The active problems and past medical history were reviewed and updated today  Surgical History  Surgical History Reviewed: The surgical history was reviewed and updated today  Family History  Mother    1  Family history of pancreatic cancer (V16 0) (Z80 0)   2  Denied: Family history of substance abuse   3  Denied: Family history of Mental health problem  Father    4  Family history of cardiac disorder (V17 49) (Z82 49)   5  Family history of myocardial infarction (V17 3) (Z82 49)   6  Denied: Family history of substance abuse   7  Denied: Family history of Mental health problem  Family History Reviewed: The family history was reviewed and updated today         Social History  Problems    ·    · Never a smoker   · No alcohol use  Social History Reviewed: The social history was reviewed and updated today  Current Meds   1  Flecainide Acetate 100 MG Oral Tablet; Take 1 tablet twice daily  Requested for: 20Jun2017; Last Rx:19Jun2017 Ordered   2  Metoprolol Succinate ER 50 MG Oral Tablet Extended Release 24 Hour; TAKE 1 TABLET TWICE DAILY; Therapy: 28ZHW8230 to (Evaluate:15Jun2018)  Requested for: 27Jun2017; Last Rx:20Jun2017 Ordered   3  Multiple Vitamins Oral Tablet; TAKE 1 TABLET DAILY; Therapy: 30Eyo6746 to Recorded  Medication List Reviewed: The medication list was reviewed and updated today  Allergies  Medication    1  Cipro SOLN    Vitals  Vital Signs    Recorded: 58WFB0491 11:03AM   Heart Rate 73, Apical   Pulse Quality Regular, Apical   Systolic 897, RUE, Sitting   Diastolic 78, RUE, Sitting   BP CUFF SIZE Large   Height 5 ft 10 in   Weight 339 lb    BMI Calculated 48 64   BSA Calculated 2 61     Physical Exam   Constitutional patient is morbidly obese, he is currently not in any acute distress  Eyes  Conjunctiva and Sclera examination: Conjunctiva pink, sclera anicteric  Ears, Nose, Mouth, and Throat - Nasal mucosa, septum, and turbinates: Normal, no edema or discharge  -- Posterior pharynx is crowded  Neck Thick neck, no jugular lymphadenopathy, no thyromegaly  Pulmonary  Auscultation of lungs: Clear to auscultation, no rales, no rhonchi, no wheezing, good air movement  Cardiovascular  Auscultation of heart: Normal rate and rhythm, normal S1 and S2, no murmurs  Examination of extremities for edema and/or varicosities: Normal    Chest - Barrel-shaped chest   Abdomen Significant central obesity, normal bowel sounds, no abdominal tenderness  Musculoskeletal Gait and station: Normal gait  Skin - Skin and subcutaneous tissue: Normal without rashes or lesions  Skin is warm and well perfused, normal turgor     Neurologic - Facial symmetry is retained, extraocular movements are full and symmetric, head neck tongue and palate movement is bilateral and symmetric, muscle bulk tone and power is appropriate  -- Speech: Normal    Psychiatric - Orientation to person, place, and time: Normal -- Mood and affect: Normal       Results/Data  ECG Report:  ekg reviewed by myself, normal sinus rhythm, normal axis, poor progression of R wave, QTc is 460 ms      Future Appointments    Date/Time Provider Specialty Site   12/21/2017 02:40 PM Cardiology Serena, Nurse Schedule  [address] [facility]     Signatures   Electronically signed by : STEPHY Griffith ; Dec 18 2017  6:25AM EST                       (Author)

## 2017-12-21 ENCOUNTER — ALLSCRIPTS OFFICE VISIT (OUTPATIENT)
Dept: OTHER | Facility: OTHER | Age: 57
End: 2017-12-21

## 2018-01-11 NOTE — RESULT NOTES
Verified Results  (1) CBC/ PLT (NO DIFF) 11Oct2017 06:54AM Katie Handy     Test Name Result Flag Reference   HEMATOCRIT 47 0 %  36 5-49 3   HEMOGLOBIN 15 9 g/dL  12 0-17 0   MCHC 33 8 g/dL  31 4-37 4   MCH 32 0 pg  26 8-34 3   MCV 95 fL  82-98   PLATELET COUNT 149 Thousands/uL  149-390   RBC COUNT 4 97 Million/uL  3 88-5 62   RDW 12 9 %  11 6-15 1   WBC COUNT 11 50 Thousand/uL H 4 31-10 16   MPV 10 0 fL  8 9-12 7     (1) HEMOGLOBIN A1C 11Oct2017 06:54AM Esperanza Arauz     Test Name Result Flag Reference   HEMOGLOBIN A1C 6 0 %  4 2-6 3   EST  AVG  GLUCOSE 126 mg/dl       (1) LIPID PANEL FASTING W DIRECT LDL REFLEX 11Oct2017 06:54AM Esperanza Arauz     Test Name Result Flag Reference   CHOLESTEROL 192 mg/dL     LDL CHOLESTEROL CALCULATED 131 mg/dL H 0-100   This is a patient instruction: This is a fasting test  Water, black tea or black coffee only after 9:00pm the night before the test  Drink 2 glasses of water the morning of the test       Triglyceride:        Normal <150 mg/dl   Borderline High 150-199 mg/dl   High 200-499 mg/dl   Very High >499 mg/dl      Cholesterol:       Desirable <200 mg/dl    Borderline High 200-239 mg/dl    High >239 mg/dl      HDL Cholesterol:       High>59 mg/dL    Low <41 mg/dL      HDL Cholesterol:       High>59 mg/dL    Low <41 mg/dL      This screening LDL is a calculated result  It does not have the accuracy of the Direct Measured LDL in the monitoring of patients with hyperlipidemia and/or statin therapy  Direct Measure LDL (YVB979) must be ordered separately in these patients  TRIGLYCERIDES 138 mg/dL  <=150   Specimen collection should occur prior to N-Acetylcysteine or Metamizole administration due to the potential for falsely depressed results  HDL,DIRECT 33 mg/dL L 40-60   Specimen collection should occur prior to Metamizole administration due to the potential for falsley depressed results       (1) COMPREHENSIVE METABOLIC PANEL 84ZQX4294 83:41DN Darron Arauz Test Name Result Flag Reference   SODIUM 139 mmol/L  136-145   POTASSIUM 4 0 mmol/L  3 5-5 3   CHLORIDE 104 mmol/L  100-108   CARBON DIOXIDE 25 mmol/L  21-32   ANION GAP (CALC) 10 mmol/L  4-13   BLOOD UREA NITROGEN 14 mg/dL  5-25   CREATININE 0 95 mg/dL  0 60-1 30   Standardized to IDMS reference method   CALCIUM 9 1 mg/dL  8 3-10 1   BILI, TOTAL 1 14 mg/dL H 0 20-1 00   ALK PHOSPHATAS 87 U/L     ALT (SGPT) 49 U/L  12-78   Specimen collection should occur prior to Sulfasalazine and/or Sulfapyridine administration due to the potential for falsely depressed results  AST(SGOT) 39 U/L  5-45   Specimen collection should occur prior to Sulfasalazine administration due to the potential for falsely depressed results  ALBUMIN 4 4 g/dL  3 5-5 0   TOTAL PROTEIN 8 1 g/dL  6 4-8 2   eGFR 90 ml/min/1 73sq m     National Kidney Disease Education Program recommendations are as follows:  GFR calculation is accurate only with a steady state creatinine  Chronic Kidney disease less than 60 ml/min/1 73 sq  meters  Kidney failure less than 15 ml/min/1 73 sq  meters  GLUCOSE FASTING 79 mg/dL  65-99   Specimen collection should occur prior to Sulfasalazine administration due to the potential for falsely depressed results  Specimen collection should occur prior to Sulfapyridine administration due to the potential for falsely elevated results  (1) TSH WITH FT4 REFLEX 11Oct2017 06:54AM Doris Sabot     Test Name Result Flag Reference   TSH 2 690 uIU/mL  0 358-3 740   Patients undergoing fluorescein dye angiography may retain small amounts of fluorescein in the body for 48-72 hours post procedure  Samples containing fluorescein can produce falsely depressed TSH values  If the patient had this procedure,a specimen should be resubmitted post fluorescein clearance       (1) PSA (SCREEN) (Dx V76 44 Screen for Prostate Cancer) 95HNE3512 06:54AM Doris Sabot     Test Name Result Flag Reference   PROSTATE SPECIFIC ANTIGEN 0 2 ng/mL  0 0-4 0   American Urological Association Guidelines define biochemical recurrence of prostate cancer as a detectable or rising PSA value post-radical prostatectomy that is greater than or equal to 0 2 ng/mL with a second confirmatory level of greater than or equal to 0 2 ng/mL  This is a patient instruction: This test is non-fasting  Please drink two glasses of water morning of bloodwork

## 2018-01-13 VITALS
WEIGHT: 315 LBS | RESPIRATION RATE: 17 BRPM | HEART RATE: 63 BPM | HEIGHT: 70 IN | TEMPERATURE: 98.2 F | DIASTOLIC BLOOD PRESSURE: 86 MMHG | OXYGEN SATURATION: 97 % | BODY MASS INDEX: 45.1 KG/M2 | SYSTOLIC BLOOD PRESSURE: 142 MMHG

## 2018-01-13 VITALS
HEART RATE: 73 BPM | HEIGHT: 70 IN | SYSTOLIC BLOOD PRESSURE: 104 MMHG | DIASTOLIC BLOOD PRESSURE: 70 MMHG | WEIGHT: 315 LBS | BODY MASS INDEX: 45.1 KG/M2

## 2018-01-13 VITALS
SYSTOLIC BLOOD PRESSURE: 132 MMHG | HEART RATE: 77 BPM | HEIGHT: 70 IN | WEIGHT: 315 LBS | DIASTOLIC BLOOD PRESSURE: 80 MMHG | BODY MASS INDEX: 45.1 KG/M2

## 2018-01-13 NOTE — PROGRESS NOTES
Chief Complaint  Patient is here for a EKG check per Dr Lev Brown  Patient denies cardiac complaints today  I reviewed the EKG with Dr with Dr Moira Lesches  EKG was in normal sinus rhythm per Dr Moira Lesches  Active Problems    1  Colon cancer screening (V76 51) (Z12 11)   2  Intermittent lightheadedness (780 4) (R42)   3  Pressure in head (784 0) (R51)   4  Tinnitus, unspecified laterality (388 30) (H93 19)   5  Ventricular tachycardia (427 1) (I47 2)    Current Meds   1  Flecainide Acetate 100 MG Oral Tablet; Take 1 tablet twice daily; Therapy: (Recorded:28Apr2017) to Recorded   2  Metoprolol Succinate ER 50 MG Oral Tablet Extended Release 24 Hour; Take 1 tablet   twice daily; Therapy: (Recorded:28Apr2017) to Recorded   3  Multiple Vitamins Oral Tablet; TAKE 1 TABLET DAILY; Therapy: 10Ryi2979 to Recorded    Allergies    1  Cipro SOLN    Vitals  Signs    Heart Rate: 73  Systolic: 203, RUE, Sitting  Diastolic: 70, RUE, Sitting  BP Cuff Size: Large  Height: 5 ft 10 in  Weight: 337 lb   BMI Calculated: 48 35  BSA Calculated: 2 61    Plan  Ventricular tachycardia    · EKG/ECG- POC; Status:Complete;   Done: 62GBS2155    Future Appointments    Date/Time Provider Specialty Site   05/11/2017 08:30 AM Cardiology, [address] [place] [address]   05/24/2017 03:20 PM STEPHY Coppola   Cardiology [facility]     Signatures   Electronically signed by : Steven Ledbetter, ; Apr 28 2017 11:47AM EST                       (Author)    Electronically signed by : STEPHY Eric ; Apr 28 2017 12:59PM EST                       (Co-author)

## 2018-01-16 NOTE — PROGRESS NOTES
Assessment    1  Preventative health care (V70 0) (Z00 00)   2  Tinnitus (388 30) (H93 19)   3  Ventricular tachycardia (427 1) (I47 2)   4  Morbid obesity (278 01) (E66 01)   5  Screening for cholesterol level (V77 91) (Z13 220)   6  Screening for diabetes mellitus (DM) (V77 1) (Z13 1)   7  Screening for iron deficiency anemia (V78 0) (Z13 0)   8  Screening for thyroid disorder (V77 0) (Z13 29)   9  Encounter for prostate cancer screening (V76 44) (Z12 5)   10  Vision problem (V41 0) (H54 7)    Plan  Colon cancer screening, Encounter for prostate cancer screening, Morbid obesity,  Preventative health care, Screening for cholesterol level, Screening for diabetes mellitus  (DM), Screening for iron deficiency anemia, Screening for thyroid disorder, Tinnitus,  Ventricular tachycardia    · (1) CBC/ PLT (NO DIFF); Status: In Progress - Specimen/Data Collected;   Done:  36PHQ0676   · (1) COMPREHENSIVE METABOLIC PANEL; Status: In Progress - Specimen/Data  Collected;   Done: 59EHT6185   · (1) HEMOGLOBIN A1C; Status: In Progress - Specimen/Data Collected;   Done:  20API5258   · (1) LIPID PANEL FASTING W DIRECT LDL REFLEX; Status: In Progress -  Specimen/Data Collected;   Done: 86EYF8125   · (1) PSA (SCREEN) (Dx V76 44 Screen for Prostate Cancer); Status: In Progress -  Specimen/Data Collected;   Done: 73JRN2121   · (1) TSH WITH FT4 REFLEX; Status: In Progress - Specimen/Data Collected;   Done:  99OJH1063  Encounter for prostate cancer screening, Morbid obesity, Screening for cholesterol level,  Screening for diabetes mellitus (DM), Screening for iron deficiency anemia, Screening  for thyroid disorder    · Routine Venipuncture - POC; Status:Complete;   Done: 08OQI9886  Tinnitus    · 2 - Cole Bella DO (Otolaryngology) Co-Management  *  Status: Hold For -  Scheduling  Requested for: 70TPO0802  Care Summary provided  : Yes  Vision problem    · Pillo Blayne  (Ophthalmology) Co-Management  *  Status: Hold For - Scheduling Requested for: 77KZN2740  Care Summary provided  : Yes    Discussion/Summary    Patient is a 30-year-old male  1   -reviewed health maintenance measures with patient today  At this time, he is due for routine blood work  Check CBC, CMP, TSH, FLP, PSA  He states that he has never had a colonoscopy  Order was placed  He states that he would like to go to the same specialist who his wife was sent to  Reviewed need for immunizations  The patient has been having denies this as well as mild vision problems  At this time, he was advised that overly beneficial for him to have a full audiologic evaluation as well as a ophthalmological evaluation  Referrals were given today  Chief Complaint  Pt presents for an annual physical  Pt would like FBW  Pt will get flu shot at work  Pt will schedule colonoscopy  History of Present Illness  , Adult Male: The patient is being seen for a health maintenance evaluation  The last health maintenance visit was Many year(s) ago  Social History: Household members include spouse and 2 daughter(s)  He is   Work status: working full time and occupation: cold storage  The patient has never smoked cigarettes  He reports occasional alcohol use  He has never used illicit drugs  General Health: The patient's health since the last visit is described as good  He has regular dental visits  He complains of vision problems  He has hearing loss  Immunizations status: not up to date  Lifestyle:  He does not have a healthy diet  He has weight concerns  He does not exercise regularly  He does not use tobacco  He consumes alcohol  He denies drug use  Screening:      Review of Systems    Constitutional: not feeling poorly and not feeling tired  Eyes: eyesight problems, but no purulent discharge from the eyes  ENT: no nosebleeds and no nasal discharge  Cardiovascular: no chest pain and no palpitations  Respiratory: no cough and no shortness of breath during exertion  Gastrointestinal: no nausea and no diarrhea  Genitourinary: no urinary hesitancy and no nocturia  Musculoskeletal: no joint swelling and no joint stiffness  Integumentary: no itching and no skin wound  Neurological: no numbness and no dizziness  Psychiatric: no anxiety and no depression  Endocrine: no muscle weakness and no erectile dysfunction  Hematologic/Lymphatic: no tendency for easy bleeding and no tendency for easy bruising  Active Problems    1  Colon cancer screening (V76 51) (Z12 11)   2  Morbid obesity (278 01) (E66 01)   3  Ventricular tachycardia (427 1) (I47 2)    Past Medical History    · History of Acute upper respiratory infection (465 9) (J06 9)   · History of Encounter for screening for diabetes mellitus (V77 1) (Z13 1)   · History of tinnitus (V12 49) (Z86 69)   · History of Intermittent lightheadedness (780 4) (R42)   · History of Laceration of thumb, left (883 0) (S61 012A)   · History of Pressure in head (784 0) (R51)   · History of Screening for cardiovascular condition (V81 2) (Z13 6)   · History of Screening for deficiency anemia (V78 1) (Z13 0)   · History of Screening for thyroid disorder (V77 0) (Z13 29)    Family History  Mother    · Family history of pancreatic cancer (V16 0) (Z80 0)  Father    · Family history of cardiac disorder (V17 49) (Z82 49)   · Family history of myocardial infarction (V17 3) (Z80 55)    Social History    ·    · Never a smoker   · No alcohol use    Current Meds   1  Flecainide Acetate 100 MG Oral Tablet; Take 1 tablet twice daily  Requested for:   20Jun2017; Last Rx:19Jun2017 Ordered   2  Metoprolol Succinate ER 50 MG Oral Tablet Extended Release 24 Hour; TAKE 1 TABLET   TWICE DAILY; Therapy: 86UHW5202 to (Evaluate:24Zes8446)  Requested for: 27Jun2017; Last   Rx:20Jun2017 Ordered   3  Multiple Vitamins Oral Tablet; TAKE 1 TABLET DAILY; Therapy: 67Nwk6446 to Recorded    Allergies    1   Cipro SOLN    Vitals   Recorded: 61RZF5712 05: 32PM   Temperature 99 1 F, Tympanic   Heart Rate 78   Pulse Quality Normal   Respiration Quality Normal   Respiration 16   Systolic 995, LUE, Sitting   Diastolic 88, LUE, Sitting   BP CUFF SIZE Large   Height 5 ft 10 in   Weight 340 lb 8 oz   BMI Calculated 48 86   BSA Calculated 2 62   O2 Saturation 96     Physical Exam    Constitutional   General appearance: No acute distress, well appearing and well nourished  Head and Face   Head and face: Normal     Palpation of the face and sinuses: No sinus tenderness  Eyes   Conjunctiva and lids: No erythema, swelling or discharge  Pupils and irises: Equal, round, reactive to light  Ears, Nose, Mouth, and Throat   External inspection of ears and nose: Normal     Otoscopic examination: Tympanic membranes translucent with normal light reflex  Canals patent without erythema  Nasal mucosa, septum, and turbinates: Normal without edema or erythema  Lips, teeth, and gums: Normal, good dentition  Oropharynx: Normal with no erythema, edema, exudate or lesions  Neck   Neck: Supple, symmetric, trachea midline, no masses  Thyroid: Normal, no thyromegaly  Pulmonary   Respiratory effort: No increased work of breathing or signs of respiratory distress  Auscultation of lungs: Clear to auscultation  Cardiovascular   Auscultation of heart: Normal rate and rhythm, normal S1 and S2, no murmurs  Peripheral vascular exam: Normal     Abdomen   Abdomen: Non-tender, no masses  Liver and spleen: No hepatomegaly or splenomegaly  Lymphatic   Palpation of lymph nodes in neck: No lymphadenopathy  Palpation of lymph nodes in axillae: No lymphadenopathy  Musculoskeletal   Gait and station: Normal     Inspection/palpation of joints, bones, and muscles: Normal     Skin   Skin and subcutaneous tissue: Normal without rashes or lesions  Palpation of skin and subcutaneous tissue: Normal turgor  Neurologic   Cranial nerves: Cranial nerves 2-12 intact  Sensation: No sensory loss  Psychiatric   Judgment and insight: Normal     Orientation to person, place and time: Normal     Recent and remote memory: Intact  Mood and affect: Normal        Signatures   Electronically signed by :  Sioamra Ulloa DO; Oct 11 2017  8:41AM EST                       (Author)

## 2018-01-22 VITALS
WEIGHT: 315 LBS | SYSTOLIC BLOOD PRESSURE: 114 MMHG | BODY MASS INDEX: 45.1 KG/M2 | HEART RATE: 73 BPM | HEIGHT: 70 IN | DIASTOLIC BLOOD PRESSURE: 78 MMHG

## 2018-01-22 VITALS
SYSTOLIC BLOOD PRESSURE: 132 MMHG | OXYGEN SATURATION: 96 % | RESPIRATION RATE: 16 BRPM | HEART RATE: 78 BPM | HEIGHT: 70 IN | TEMPERATURE: 99.1 F | BODY MASS INDEX: 45.1 KG/M2 | DIASTOLIC BLOOD PRESSURE: 88 MMHG | WEIGHT: 315 LBS

## 2018-01-23 NOTE — MISCELLANEOUS
Message  pt came in for an ekg  Rosanne Joseph he feels great  no lightheadedness or dizzyness  on flecainide 100mg bid and metoprolol succ  50mg daily  Active Problems    1  Colon cancer screening (V76 51) (Z12 11)   2  Encounter for prostate cancer screening (V76 44) (Z12 5)   3  Morbid obesity (278 01) (E66 01)   4  Preventative health care (V70 0) (Z00 00)   5  Screening for cholesterol level (V77 91) (Z13 220)   6  Screening for diabetes mellitus (DM) (V77 1) (Z13 1)   7  Screening for iron deficiency anemia (V78 0) (Z13 0)   8  Screening for thyroid disorder (V77 0) (Z13 29)   9  Tinnitus (388 30) (H93 19)   10  Ventricular tachycardia (427 1) (I47 2)   11  Vision problem (V41 0) (H54 7)    Current Meds   1  Flecainide Acetate 100 MG Oral Tablet; Take 1 tablet twice daily  Requested for:   20Jun2017; Last Rx:19Jun2017 Ordered   2  Metoprolol Succinate ER 50 MG Oral Tablet Extended Release 24 Hour; TAKE 1 TABLET   TWICE DAILY; Therapy: 61IMT0104 to (Evaluate:15Jun2018)  Requested for: 27Jun2017; Last   Rx:20Jun2017 Ordered   3  Multiple Vitamins Oral Tablet; TAKE 1 TABLET DAILY; Therapy: 24Phl6406 to Recorded    Allergies    1  Cipro SOLN    Plan  Ventricular tachycardia    · EKG/ECG- POC; Status:Complete;   Done: 79NMU8728 02:50PM    Discussion/Summary    Normal QRS and QTc        Signatures   Electronically signed by : STEPHY Marques ; Dec 25 2017  9:35AM EST                       (Author)

## 2018-02-05 ENCOUNTER — APPOINTMENT (OUTPATIENT)
Dept: URGENT CARE | Facility: CLINIC | Age: 58
End: 2018-02-05
Payer: OTHER MISCELLANEOUS

## 2018-02-05 ENCOUNTER — APPOINTMENT (OUTPATIENT)
Dept: RADIOLOGY | Facility: CLINIC | Age: 58
End: 2018-02-05
Payer: OTHER MISCELLANEOUS

## 2018-02-05 ENCOUNTER — TRANSCRIBE ORDERS (OUTPATIENT)
Dept: URGENT CARE | Facility: CLINIC | Age: 58
End: 2018-02-05

## 2018-02-05 DIAGNOSIS — S99.912A LEFT ANKLE INJURY, INITIAL ENCOUNTER: Primary | ICD-10-CM

## 2018-02-05 PROCEDURE — 99283 EMERGENCY DEPT VISIT LOW MDM: CPT | Performed by: NURSE PRACTITIONER

## 2018-02-05 PROCEDURE — 73610 X-RAY EXAM OF ANKLE: CPT

## 2018-02-05 PROCEDURE — G0382 LEV 3 HOSP TYPE B ED VISIT: HCPCS | Performed by: NURSE PRACTITIONER

## 2018-02-08 ENCOUNTER — APPOINTMENT (OUTPATIENT)
Dept: URGENT CARE | Facility: CLINIC | Age: 58
End: 2018-02-08
Payer: OTHER MISCELLANEOUS

## 2018-02-08 PROCEDURE — 99213 OFFICE O/P EST LOW 20 MIN: CPT | Performed by: NURSE PRACTITIONER

## 2018-02-15 ENCOUNTER — APPOINTMENT (OUTPATIENT)
Dept: URGENT CARE | Facility: CLINIC | Age: 58
End: 2018-02-15
Payer: OTHER MISCELLANEOUS

## 2018-02-15 PROCEDURE — 99213 OFFICE O/P EST LOW 20 MIN: CPT

## 2018-06-02 DIAGNOSIS — I47.2 VT (VENTRICULAR TACHYCARDIA) (HCC): Primary | ICD-10-CM

## 2018-06-02 RX ORDER — METOPROLOL SUCCINATE 50 MG/1
TABLET, EXTENDED RELEASE ORAL
Qty: 180 TABLET | Refills: 3 | Status: SHIPPED | OUTPATIENT
Start: 2018-06-02 | End: 2019-01-01 | Stop reason: SDUPTHER

## 2018-06-02 RX ORDER — FLECAINIDE ACETATE 100 MG/1
TABLET ORAL
Qty: 180 TABLET | Refills: 3 | Status: SHIPPED | OUTPATIENT
Start: 2018-06-02 | End: 2019-01-01 | Stop reason: SDUPTHER

## 2019-01-01 ENCOUNTER — OFFICE VISIT (OUTPATIENT)
Dept: FAMILY MEDICINE CLINIC | Facility: CLINIC | Age: 59
End: 2019-01-01
Payer: COMMERCIAL

## 2019-01-01 VITALS
OXYGEN SATURATION: 95 % | BODY MASS INDEX: 46.65 KG/M2 | TEMPERATURE: 98.5 F | HEART RATE: 73 BPM | SYSTOLIC BLOOD PRESSURE: 120 MMHG | RESPIRATION RATE: 18 BRPM | DIASTOLIC BLOOD PRESSURE: 80 MMHG | HEIGHT: 69 IN | WEIGHT: 315 LBS

## 2019-01-01 DIAGNOSIS — I47.2 VT (VENTRICULAR TACHYCARDIA) (HCC): ICD-10-CM

## 2019-01-01 DIAGNOSIS — M54.50 ACUTE LEFT-SIDED LOW BACK PAIN WITHOUT SCIATICA: Primary | ICD-10-CM

## 2019-01-01 PROCEDURE — 3008F BODY MASS INDEX DOCD: CPT | Performed by: FAMILY MEDICINE

## 2019-01-01 PROCEDURE — 99213 OFFICE O/P EST LOW 20 MIN: CPT | Performed by: FAMILY MEDICINE

## 2019-01-01 RX ORDER — METOPROLOL SUCCINATE 50 MG/1
TABLET, EXTENDED RELEASE ORAL
Qty: 180 TABLET | Refills: 1 | Status: SHIPPED | OUTPATIENT
Start: 2019-01-01 | End: 2019-01-01 | Stop reason: SDUPTHER

## 2019-01-01 RX ORDER — METOPROLOL SUCCINATE 50 MG/1
50 TABLET, EXTENDED RELEASE ORAL 2 TIMES DAILY
Qty: 180 TABLET | Refills: 3 | Status: SHIPPED | OUTPATIENT
Start: 2019-01-01 | End: 2020-05-29

## 2019-01-01 RX ORDER — FLECAINIDE ACETATE 100 MG/1
TABLET ORAL
Qty: 180 TABLET | Refills: 1 | Status: SHIPPED | OUTPATIENT
Start: 2019-01-01 | End: 2019-01-01 | Stop reason: SDUPTHER

## 2019-01-01 RX ORDER — PREDNISONE 10 MG/1
TABLET ORAL
Qty: 30 TABLET | Refills: 0 | Status: SHIPPED | OUTPATIENT
Start: 2019-01-01

## 2019-01-01 RX ORDER — METHOCARBAMOL 500 MG/1
500 TABLET, FILM COATED ORAL 4 TIMES DAILY
Qty: 20 TABLET | Refills: 0 | Status: SHIPPED | OUTPATIENT
Start: 2019-01-01

## 2019-01-01 RX ORDER — HYDROCODONE BITARTRATE AND ACETAMINOPHEN 5; 325 MG/1; MG/1
1 TABLET ORAL EVERY 6 HOURS PRN
Qty: 10 TABLET | Refills: 0 | Status: SHIPPED | OUTPATIENT
Start: 2019-01-01

## 2019-01-01 RX ORDER — FLECAINIDE ACETATE 100 MG/1
100 TABLET ORAL 2 TIMES DAILY
Qty: 180 TABLET | Refills: 3 | Status: SHIPPED | OUTPATIENT
Start: 2019-01-01 | End: 2020-05-29

## 2019-09-03 NOTE — PROGRESS NOTES
[facility]      NAME: Rachell Francois  AGE: 66 y o  SEX: male  : 1960   MRN: [id number]    DATE: 9/3/2019  TIME: 3:14 PM    Assessment and Plan     Problem List Items Addressed This Visit     None      Visit Diagnoses     Acute left-sided low back pain without sciatica    -  Primary    Relevant Medications    predniSONE 10 mg tablet    methocarbamol (ROBAXIN) 500 mg tablet    HYDROcodone-acetaminophen (NORCO) 5-325 mg per tablet       Patient with acute low back pain without radicular symptoms  Will treat with steroid, muscle relaxant and hydrocodone for severe breakthrough  Patient will be out of work for the next 2 days  If he needs to stay out longer than that he will call for a note  If symptoms persist will refer for physical therapy  Return to office in:  P r n  Chief Complaint     Chief Complaint   Patient presents with    Back Pain     x 2 weeks       History of Present Illness     Patient presents with a chief complaint of low back pain  He has had intermittent back pain over the last week  There has been no specific trauma or unusual activity  Pain progressed over the last 24 hours and today has been persistent  Pain is primarily located in the low back on the left side but radiates across the midline  He has no lower extremity or radicular symptoms  He has no change in bowel or bladder  The following portions of the patient's history were reviewed and updated as appropriate: allergies, current medications, past family history, past medical history, past social history, past surgical history and problem list     Review of Systems   Review of Systems   Constitutional: Negative  Respiratory: Negative  Cardiovascular: Negative  Gastrointestinal: Negative  Genitourinary: Negative  Musculoskeletal: Positive for back pain  Psychiatric/Behavioral: Negative          Active Problem List     Patient Active Problem List   Diagnosis    Ventricular tachycardia (HCC)       Objective   /80 (BP Location: Left arm, Patient Position: Sitting, Cuff Size: Large)   Pulse 73   Temp 98 5 °F (36 9 °C) (Tympanic)   Resp 18   Ht 5' 8 9" (1 75 m)   Wt (!) 154 kg (339 lb)   SpO2 95%   BMI 50 21 kg/m²     Physical Exam   Constitutional: He is oriented to person, place, and time  He appears well-developed and well-nourished  No distress  HENT:   Head: Normocephalic and atraumatic  Eyes: Pupils are equal, round, and reactive to light  Conjunctivae are normal  Right eye exhibits no discharge  Neck: Normal range of motion  No thyromegaly present  Cardiovascular: Normal rate and regular rhythm  Pulmonary/Chest: Effort normal and breath sounds normal  No respiratory distress  Musculoskeletal:   Negative straight leg raising  Normal reflexes  Normal strength lower extremity   Lymphadenopathy:     He has no cervical adenopathy  Neurological: He is alert and oriented to person, place, and time  Skin: Skin is warm and dry  He is not diaphoretic  Psychiatric: He has a normal mood and affect  His behavior is normal  Judgment and thought content normal    Nursing note and vitals reviewed          Current Medications     Current Outpatient Medications:     flecainide (TAMBOCOR) 100 mg tablet, Take 1 tablet (100 mg total) by mouth 2 (two) times a day, Disp: 180 tablet, Rfl: 3    metoprolol succinate (TOPROL-XL) 50 mg 24 hr tablet, Take 1 tablet (50 mg total) by mouth 2 (two) times a day, Disp: 180 tablet, Rfl: 3    Multiple Vitamin (MULTIVITAMIN) tablet, Take 1 tablet by mouth daily, Disp: , Rfl:     HYDROcodone-acetaminophen (NORCO) 5-325 mg per tablet, Take 1 tablet by mouth every 6 (six) hours as needed for painMax Daily Amount: 4 tablets, Disp: 10 tablet, Rfl: 0    methocarbamol (ROBAXIN) 500 mg tablet, Take 1 tablet (500 mg total) by mouth 4 (four) times a day, Disp: 20 tablet, Rfl: 0    predniSONE 10 mg tablet, Five p o  for 2 days Four p o  for 2 Three p o  for 2 days Two p o  for 2 days One p o  for 2 days, Disp: 30 tablet, Rfl: 0    Health Maintenance     Health Maintenance   Topic Date Due    Hepatitis C Screening  1960    BMI: Followup Plan  11/26/1980    CRC Screening: Colonoscopy  06/07/2010    DTaP,Tdap,and Td Vaccines (1 - Tdap) 05/17/2014    INFLUENZA VACCINE  07/01/2019    Depression Screening PHQ  09/03/2020    BMI: Adult  09/03/2020    Pneumococcal Vaccine: 65+ Years (1 of 2 - PCV13) 11/26/2027    Pneumococcal Vaccine: Pediatrics (0 to 5 Years) and At-Risk Patients (6 to 59 Years)  Aged Out    HEPATITIS B VACCINES  Aged [employer] History   Administered Date(s) Administered    Influenza TIV (IM) 1960    Td (adult), adsorbed 05/16/2014       DO [facility]

## 2020-01-01 ENCOUNTER — TELEPHONE (OUTPATIENT)
Dept: FAMILY MEDICINE CLINIC | Facility: CLINIC | Age: 60
End: 2020-01-01

## 2020-01-01 ENCOUNTER — NURSE TRIAGE (OUTPATIENT)
Dept: OTHER | Facility: OTHER | Age: 60
End: 2020-01-01

## 2020-01-01 ENCOUNTER — TELEPHONE (OUTPATIENT)
Dept: OTHER | Facility: OTHER | Age: 60
End: 2020-01-01

## 2020-01-01 ENCOUNTER — TELEPHONE (OUTPATIENT)
Dept: LAB | Facility: HOSPITAL | Age: 60
End: 2020-01-01

## 2020-01-01 ENCOUNTER — TELEMEDICINE (OUTPATIENT)
Dept: FAMILY MEDICINE CLINIC | Facility: CLINIC | Age: 60
End: 2020-01-01
Payer: COMMERCIAL

## 2020-01-01 DIAGNOSIS — Z20.828 EXPOSURE TO SARS-ASSOCIATED CORONAVIRUS: Primary | ICD-10-CM

## 2020-01-01 DIAGNOSIS — U07.1 COVID-19 VIRUS INFECTION: Primary | ICD-10-CM

## 2020-01-01 DIAGNOSIS — M54.50 ACUTE LOW BACK PAIN WITHOUT SCIATICA, UNSPECIFIED BACK PAIN LATERALITY: ICD-10-CM

## 2020-01-01 DIAGNOSIS — Z20.828 EXPOSURE TO SARS-ASSOCIATED CORONAVIRUS: ICD-10-CM

## 2020-01-01 DIAGNOSIS — B34.9 VIRAL SYNDROME: ICD-10-CM

## 2020-01-01 DIAGNOSIS — R11.0 NAUSEA: ICD-10-CM

## 2020-01-01 LAB — SARS-COV-2 RNA SPEC QL NAA+PROBE: DETECTED

## 2020-01-01 PROCEDURE — G2012 BRIEF CHECK IN BY MD/QHP: HCPCS | Performed by: FAMILY MEDICINE

## 2020-01-01 PROCEDURE — 87635 SARS-COV-2 COVID-19 AMP PRB: CPT

## 2020-01-01 RX ORDER — METHOCARBAMOL 750 MG/1
TABLET, FILM COATED ORAL
Qty: 20 TABLET | Refills: 0 | Status: SHIPPED | OUTPATIENT
Start: 2020-01-01

## 2020-01-01 RX ORDER — ONDANSETRON 4 MG/1
4 TABLET, ORALLY DISINTEGRATING ORAL EVERY 8 HOURS PRN
Qty: 20 TABLET | Refills: 0 | Status: SHIPPED | OUTPATIENT
Start: 2020-01-01

## 2020-03-29 NOTE — TELEPHONE ENCOUNTER
Patient called to say that he was exposed to a co worker that recently tested positive for COVID 19  He now has a cough, sore throat, headache and a fever of 101

## 2020-03-29 NOTE — PROGRESS NOTES
COVID-19 Virtual Visit     This virtual check-in was done via telephone  Encounter provider Valeria Whipple DO    Provider located at [place] [address]  [address] [facility] [address]  [phone]    Recent Visits  No visits were found meeting these conditions  Showing recent visits within past 7 days and meeting all other requirements     Today's Visits  Date Type Provider Dept   03/29/20 [place]medicine [place] [id number] [facility] today's visits and meeting all other requirements     Future Appointments  Date Type Provider Dept   03/29/20 Telemedicine Valeria Whipple DO [facility]   Showing future appointments within next 150 days and meeting all other requirements        Patient agrees to participate in a virtual check in via telephone or video visit instead of presenting to the office to address urgent/immediate medical needs  Patient is aware this is a billable service  After connecting through [employer], the patient was identified by name and date of birth  Jeffrey Ramirez was informed that this was a telemedicine visit and that the exam was being conducted confidentially over secure lines  My office door was closed  No one else was in the room  Jeffrey Ramirez acknowledged consent and understanding of privacy and security of the telemedicine visit  I informed the patient that I have reviewed his record in Epic and presented the opportunity for him to ask any questions regarding the visit today  The patient agreed to participate  Jeffrey Ramirez is a 64 y o  male who is concerned about COVID-19  He reports fever and cough  He has not traveled outside the [place]  within the last 14 days    He has had contact with a person who is under investigation for or who is positive for COVID-19 within the last 14 days  He has not been hospitalized recently for fever and/or lower respiratory symptoms      Past Medical History: Diagnosis Date    Obesity        Past Surgical History:   Procedure Laterality Date    COLON SURGERY      HERNIA REPAIR         Current Outpatient Medications   Medication Sig Dispense Refill    flecainide (TAMBOCOR) 100 mg tablet Take 1 tablet (100 mg total) by mouth 2 (two) times a day 180 tablet 3    HYDROcodone-acetaminophen (NORCO) 5-325 mg per tablet Take 1 tablet by mouth every 6 (six) hours as needed for painMax Daily Amount: 4 tablets 10 tablet 0    methocarbamol (ROBAXIN) 500 mg tablet Take 1 tablet (500 mg total) by mouth 4 (four) times a day 20 tablet 0    metoprolol succinate (TOPROL-XL) 50 mg 24 hr tablet Take 1 tablet (50 mg total) by mouth 2 (two) times a day 180 tablet 3    Multiple Vitamin (MULTIVITAMIN) tablet Take 1 tablet by mouth daily      predniSONE 10 mg tablet Five p o  for 2 days Four p o  for 2 Three p o  for 2 days Two p o  for 2 days One p o  for 2 days 30 tablet 0     No current facility-administered medications for this visit  Allergies   Allergen Reactions    Ciprofloxacin        Video Exam    Anne Marie Hernandez appears healthy  Disposition: This is a telephone virtual visit  Patient has 2 day history of cough, headache, congestion  This morning, he woke up with loose bowels, diffuse body aches, and T-max of 101°  Denies any chest pain or shortness of breath  Denies any recent travel  A co-worker on his shift at [employer] (a food [employer]) recently tested positive for COVID-19  I discussed patient's options for testing, including going directly to [facility], ED, or centralized testing site  He is aware centralized testing site is not open until 8:00 a m  tomorrow  Patient prefers to go to centralized testing site for swabbing, but has been notified to contact me should his symptoms worsen in the meantime  He will stay out of work for at least 1 week  He will also continue his self-quarantine (he lives with his wife)    Will contact him with results  I referred Geovani Laws to one of our centralized sites for a COVID-19 swab  I spent 10 minutes with the patient during this virtual check-in visit

## 2020-04-13 ENCOUNTER — TELEPHONE (OUTPATIENT)
Dept: FAMILY MEDICINE CLINIC | Facility: CLINIC | Age: 60
End: 2020-04-13

## 2020-05-29 DIAGNOSIS — I47.2 VT (VENTRICULAR TACHYCARDIA) (HCC): ICD-10-CM

## 2020-05-29 RX ORDER — FLECAINIDE ACETATE 100 MG/1
TABLET ORAL
Qty: 180 TABLET | Refills: 3 | Status: SHIPPED | OUTPATIENT
Start: 2020-05-29

## 2020-05-29 RX ORDER — METOPROLOL SUCCINATE 50 MG/1
TABLET, EXTENDED RELEASE ORAL
Qty: 180 TABLET | Refills: 3 | Status: SHIPPED | OUTPATIENT
Start: 2020-05-29

## 2020-06-02 ENCOUNTER — TELEPHONE (OUTPATIENT)
Dept: FAMILY MEDICINE CLINIC | Facility: CLINIC | Age: 60
End: 2020-06-02